# Patient Record
Sex: MALE | Race: WHITE | NOT HISPANIC OR LATINO
[De-identification: names, ages, dates, MRNs, and addresses within clinical notes are randomized per-mention and may not be internally consistent; named-entity substitution may affect disease eponyms.]

---

## 2018-06-29 PROBLEM — Z00.00 ENCOUNTER FOR PREVENTIVE HEALTH EXAMINATION: Status: ACTIVE | Noted: 2018-06-29

## 2018-07-05 ENCOUNTER — APPOINTMENT (OUTPATIENT)
Dept: OTHER | Facility: CLINIC | Age: 58
End: 2018-07-05
Payer: COMMERCIAL

## 2018-07-05 VITALS
DIASTOLIC BLOOD PRESSURE: 66 MMHG | HEIGHT: 71 IN | SYSTOLIC BLOOD PRESSURE: 109 MMHG | OXYGEN SATURATION: 98 % | HEART RATE: 60 BPM | RESPIRATION RATE: 16 BRPM | BODY MASS INDEX: 29.12 KG/M2 | WEIGHT: 208 LBS

## 2018-07-05 DIAGNOSIS — K21.9 GASTRO-ESOPHAGEAL REFLUX DISEASE W/OUT ESOPHAGITIS: ICD-10-CM

## 2018-07-05 PROCEDURE — 99396 PREV VISIT EST AGE 40-64: CPT

## 2018-07-05 PROCEDURE — 96150: CPT

## 2018-07-05 PROCEDURE — 94010 BREATHING CAPACITY TEST: CPT

## 2018-07-06 DIAGNOSIS — J38.2 NODULES OF VOCAL CORDS: ICD-10-CM

## 2018-07-06 LAB
ALBUMIN SERPL ELPH-MCNC: 4.2 G/DL
ALP BLD-CCNC: 49 U/L
ALT SERPL-CCNC: 26 U/L
ANION GAP SERPL CALC-SCNC: 16 MMOL/L
APPEARANCE: CLEAR
AST SERPL-CCNC: 29 U/L
BASOPHILS # BLD AUTO: 0.02 K/UL
BASOPHILS NFR BLD AUTO: 0.3 %
BILIRUB SERPL-MCNC: 0.3 MG/DL
BILIRUBIN URINE: NEGATIVE
BLOOD URINE: NEGATIVE
BUN SERPL-MCNC: 16 MG/DL
CALCIUM SERPL-MCNC: 9.8 MG/DL
CHLORIDE SERPL-SCNC: 101 MMOL/L
CHOLEST SERPL-MCNC: 259 MG/DL
CHOLEST/HDLC SERPL: 3.3 RATIO
CO2 SERPL-SCNC: 24 MMOL/L
COLOR: YELLOW
CREAT SERPL-MCNC: 1.1 MG/DL
EOSINOPHIL # BLD AUTO: 0.05 K/UL
EOSINOPHIL NFR BLD AUTO: 0.7 %
GLUCOSE QUALITATIVE U: NEGATIVE MG/DL
GLUCOSE SERPL-MCNC: 100 MG/DL
HCT VFR BLD CALC: 45 %
HDLC SERPL-MCNC: 79 MG/DL
HGB BLD-MCNC: 15.1 G/DL
IMM GRANULOCYTES NFR BLD AUTO: 0.3 %
KETONES URINE: NEGATIVE
LDLC SERPL CALC-MCNC: 158 MG/DL
LEUKOCYTE ESTERASE URINE: NEGATIVE
LYMPHOCYTES # BLD AUTO: 1.92 K/UL
LYMPHOCYTES NFR BLD AUTO: 27.8 %
MAN DIFF?: NORMAL
MCHC RBC-ENTMCNC: 31.2 PG
MCHC RBC-ENTMCNC: 33.6 GM/DL
MCV RBC AUTO: 93 FL
MONOCYTES # BLD AUTO: 0.4 K/UL
MONOCYTES NFR BLD AUTO: 5.8 %
NEUTROPHILS # BLD AUTO: 4.5 K/UL
NEUTROPHILS NFR BLD AUTO: 65.1 %
NITRITE URINE: NEGATIVE
PH URINE: 5.5
PLATELET # BLD AUTO: 246 K/UL
POTASSIUM SERPL-SCNC: 4.4 MMOL/L
PROT SERPL-MCNC: 7.4 G/DL
PROTEIN URINE: NEGATIVE MG/DL
RBC # BLD: 4.84 M/UL
RBC # FLD: 13.3 %
SODIUM SERPL-SCNC: 141 MMOL/L
SPECIFIC GRAVITY URINE: 1.01
TRIGL SERPL-MCNC: 109 MG/DL
UROBILINOGEN URINE: NEGATIVE MG/DL
WBC # FLD AUTO: 6.91 K/UL

## 2018-08-20 PROBLEM — J38.2 VOCAL CORD NODULES: Status: ACTIVE | Noted: 2018-08-20

## 2018-08-23 DIAGNOSIS — Z03.89 ENCOUNTER FOR OBSERVATION FOR OTHER SUSPECTED DISEASES AND CONDITIONS RULED OUT: ICD-10-CM

## 2018-09-10 ENCOUNTER — APPOINTMENT (OUTPATIENT)
Dept: PULMONOLOGY | Facility: CLINIC | Age: 58
End: 2018-09-10
Payer: COMMERCIAL

## 2018-09-10 VITALS
OXYGEN SATURATION: 98 % | WEIGHT: 210 LBS | HEART RATE: 69 BPM | TEMPERATURE: 98 F | RESPIRATION RATE: 16 BRPM | SYSTOLIC BLOOD PRESSURE: 110 MMHG | BODY MASS INDEX: 29.4 KG/M2 | HEIGHT: 71 IN | DIASTOLIC BLOOD PRESSURE: 70 MMHG

## 2018-09-10 PROCEDURE — 99204 OFFICE O/P NEW MOD 45 MIN: CPT

## 2018-11-05 ENCOUNTER — OUTPATIENT (OUTPATIENT)
Dept: OUTPATIENT SERVICES | Facility: HOSPITAL | Age: 58
LOS: 1 days | End: 2018-11-05
Payer: COMMERCIAL

## 2018-11-05 ENCOUNTER — APPOINTMENT (OUTPATIENT)
Dept: SLEEP CENTER | Facility: CLINIC | Age: 58
End: 2018-11-05
Payer: COMMERCIAL

## 2018-11-05 PROCEDURE — 95810 POLYSOM 6/> YRS 4/> PARAM: CPT | Mod: 26

## 2018-11-05 PROCEDURE — 95810 POLYSOM 6/> YRS 4/> PARAM: CPT

## 2018-11-06 DIAGNOSIS — Z03.89 ENCOUNTER FOR OBSERVATION FOR OTHER SUSPECTED DISEASES AND CONDITIONS RULED OUT: ICD-10-CM

## 2019-01-07 ENCOUNTER — APPOINTMENT (OUTPATIENT)
Dept: NEUROLOGY | Facility: CLINIC | Age: 59
End: 2019-01-07

## 2019-02-19 ENCOUNTER — MEDICATION RENEWAL (OUTPATIENT)
Age: 59
End: 2019-02-19

## 2019-02-22 ENCOUNTER — RX RENEWAL (OUTPATIENT)
Age: 59
End: 2019-02-22

## 2019-04-16 ENCOUNTER — RX RENEWAL (OUTPATIENT)
Age: 59
End: 2019-04-16

## 2019-07-31 ENCOUNTER — RX RENEWAL (OUTPATIENT)
Age: 59
End: 2019-07-31

## 2019-08-13 ENCOUNTER — APPOINTMENT (OUTPATIENT)
Dept: OTHER | Facility: CLINIC | Age: 59
End: 2019-08-13
Payer: COMMERCIAL

## 2019-08-13 VITALS
HEART RATE: 60 BPM | DIASTOLIC BLOOD PRESSURE: 70 MMHG | SYSTOLIC BLOOD PRESSURE: 112 MMHG | BODY MASS INDEX: 28.42 KG/M2 | WEIGHT: 203 LBS | OXYGEN SATURATION: 99 % | RESPIRATION RATE: 16 BRPM | HEIGHT: 71 IN

## 2019-08-13 DIAGNOSIS — Z04.9 ENCOUNTER FOR EXAMINATION AND OBSERVATION FOR UNSPECIFIED REASON: ICD-10-CM

## 2019-08-13 PROCEDURE — 94010 BREATHING CAPACITY TEST: CPT

## 2019-08-13 PROCEDURE — 99214 OFFICE O/P EST MOD 30 MIN: CPT | Mod: 25

## 2019-08-13 PROCEDURE — 99396 PREV VISIT EST AGE 40-64: CPT | Mod: 25

## 2019-08-13 NOTE — DISCUSSION/SUMMARY
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 \par  Exposed [place] [address] \par  Stated that started to experience dry cough , throat irritation , hoarseness, heartburn at the end of 2002 / did report all Sx during MV1 2008 and being treated with Aciphex/ , \par  He was evaluated by his MD< was prescribed Aciphex \par Subsequently was evaluated by ENT and GI doctor s\par  Now in Protonix \par ENT Diagnosed with VC nodules due to GERD \par Treated with DIET and PPI \par  EGD 05 10 2018\par Colonoscopy 3 years ago \par MEDS : Protonix, tried to wean \par PMH: GERD, VC nodules \par  PSH: lipoma removal from groin \par  Quit smoking 1998 , smokes 1 PPD 6 years \par  PE in trial DB \par Spirometry NL \par Had CXR 06 2018 \par  \par a/p WTC periodic MV\par  GERD-see OCC MEd follow up ntoe \par  [Please See Note in Chart and Documentation in Trial DB] : Please see note in chart and documentation in Trial DB.

## 2019-08-13 NOTE — HISTORY OF PRESENT ILLNESS
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 \par  Exposed to [place] of [place] [address] \par  Stated that started to experience dry cough , throat irritation , hoarseness, heartburn at the end of 2002 / did report all Sx during MV1 2008 and being treated with Aciphex/ , \par  He was evaluated by his MD< was prescribed Aciphex \par Subsequently was evaluated by ENT and GI doctor s\par  Now in Protonix, triwed to stop but had to resume due to Sx recurrence \par ENT Diagnosed with 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

## 2019-08-13 NOTE — PHYSICAL EXAM
[General Appearance - Alert] : alert [General Appearance - In No Acute Distress] : in no acute distress [Neck Appearance] : the appearance of the neck was normal [Neck Cervical Mass (___cm)] : no neck mass was observed [Jugular Venous Distention Increased] : there was no jugular-venous distention [Thyroid Nodule] : there were no palpable thyroid nodules [Thyroid Diffuse Enlargement] : the thyroid was not enlarged [] : no respiratory distress [Auscultation Breath Sounds / Voice Sounds] : lungs were clear to auscultation bilaterally [Heart Sounds] : normal S1 and S2 [Heart Rate And Rhythm] : heart rate was normal and rhythm regular [Murmurs] : no murmurs [Heart Sounds Gallop] : no gallops [Penis Abnormality] : the penis was normal [Heart Sounds Pericardial Friction Rub] : no pericardial rub [Scrotum] : the scrotum was normal [Testes Swelling] : the testicles were not swollen [Testes Mass (___cm)] : there were no testicular masses

## 2019-08-13 NOTE — REASON FOR VISIT
[Follow-Up] : a follow-up visit [FreeTextEntry1] : [facility]ER[facility]

## 2019-08-13 NOTE — HISTORY OF PRESENT ILLNESS
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 \par  Exposed to significant [place] of [place] [address] \par  Stated that started to experience dry cough , throat irritation , hoarseness, heartburn at the end of 2002 / did report all Sx during MV1 2008 and being treated with Aciphex/ , \par  He was evaluated by his MD< was prescribed Aciphex \par Subsequently was evaluated by ENT and GI doctor s\par  Now in Protonix, triwed to stop but had to resume due to Sx recurrence \par ENT Diagnosed with 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

## 2019-08-13 NOTE — HEALTH RISK ASSESSMENT
[Patient reported colonoscopy was normal] : Patient reported colonoscopy was normal [ColonoscopyDate] : 05/10/2016

## 2019-08-13 NOTE — PHYSICAL EXAM
[General Appearance - Alert] : alert [General Appearance - In No Acute Distress] : in no acute distress [Neck Appearance] : the appearance of the neck was normal [Neck Cervical Mass (___cm)] : no neck mass was observed [Jugular Venous Distention Increased] : there was no jugular-venous distention [Thyroid Diffuse Enlargement] : the thyroid was not enlarged [Thyroid Nodule] : there were no palpable thyroid nodules [] : no respiratory distress [Auscultation Breath Sounds / Voice Sounds] : lungs were clear to auscultation bilaterally [Heart Sounds] : normal S1 and S2 [Heart Rate And Rhythm] : heart rate was normal and rhythm regular [Heart Sounds Gallop] : no gallops [Murmurs] : no murmurs [Heart Sounds Pericardial Friction Rub] : no pericardial rub [Penis Abnormality] : the penis was normal [Scrotum] : the scrotum was normal [Testes Swelling] : the testicles were not swollen [Testes Mass (___cm)] : there were no testicular masses

## 2019-08-13 NOTE — ASSESSMENT
[FreeTextEntry1] : discussed possible side effects of long term PPI use \par diet discussed \par  pt will attempt wean off PPI again \par \par Sx of BISI \par  will repeat PSG despite having a negative test last year

## 2019-08-13 NOTE — DISCUSSION/SUMMARY
[Patient seen for WTC Monitoring ___] : Patient was seen for WTC monitoring [unfilled] [Please See Note in Chart and Documentation in Trial DB] : Please see note in chart and documentation in Trial DB. 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 \par  Ex[place] to [place] [address] \par  Stated that started to experience dry cough , throat irritation , hoarseness, heartburn at the end of 2002 / did report all Sx during MV1 2008 and being treated with Aciphex/ , \par  He was evaluated by his MD< was prescribed Aciphex \par Subsequently was evaluated by ENT and GI doctor s\par  Now in Protonix \par ENT Diagnosed with VC nodules due to GERD \par Treated with DIET and PPI \par  EGD 05 10 2018\par Colonoscopy 3 years ago \par MEDS : Protonix, tried to wean \par PMH: GERD, VC nodules \par  PSH: lipoma removal from groin \par  Quit smoking 1998 , smokes 1 PPD 6 years \par  PE in trial DB \par Spirometry NL \par Had CXR 06 2018 \par  \par a/p WTC periodic MV\par  GERD-see OCC MEd follow up ntoe \par

## 2019-08-14 LAB
ALBUMIN SERPL ELPH-MCNC: 4.6 G/DL
ALP BLD-CCNC: 57 U/L
ALT SERPL-CCNC: 26 U/L
ANION GAP SERPL CALC-SCNC: 13 MMOL/L
APPEARANCE: CLEAR
AST SERPL-CCNC: 32 U/L
BACTERIA: NEGATIVE
BASOPHILS # BLD AUTO: 0.04 K/UL
BASOPHILS NFR BLD AUTO: 0.5 %
BILIRUB SERPL-MCNC: 0.3 MG/DL
BILIRUBIN URINE: NEGATIVE
BLOOD URINE: NEGATIVE
BUN SERPL-MCNC: 12 MG/DL
CALCIUM SERPL-MCNC: 10.2 MG/DL
CHLORIDE SERPL-SCNC: 100 MMOL/L
CHOLEST SERPL-MCNC: 188 MG/DL
CHOLEST/HDLC SERPL: 2.8 RATIO
CO2 SERPL-SCNC: 27 MMOL/L
COLOR: COLORLESS
CREAT SERPL-MCNC: 1.02 MG/DL
EOSINOPHIL # BLD AUTO: 0.07 K/UL
EOSINOPHIL NFR BLD AUTO: 0.9 %
GLUCOSE QUALITATIVE U: NEGATIVE
GLUCOSE SERPL-MCNC: 99 MG/DL
HCT VFR BLD CALC: 46.3 %
HDLC SERPL-MCNC: 68 MG/DL
HGB BLD-MCNC: 14.9 G/DL
HYALINE CASTS: 0 /LPF
IMM GRANULOCYTES NFR BLD AUTO: 0.3 %
KETONES URINE: NEGATIVE
LDLC SERPL CALC-MCNC: 94 MG/DL
LEUKOCYTE ESTERASE URINE: NEGATIVE
LYMPHOCYTES # BLD AUTO: 1.62 K/UL
LYMPHOCYTES NFR BLD AUTO: 20.6 %
MAN DIFF?: NORMAL
MCHC RBC-ENTMCNC: 30 PG
MCHC RBC-ENTMCNC: 32.2 GM/DL
MCV RBC AUTO: 93.3 FL
MICROSCOPIC-UA: NORMAL
MONOCYTES # BLD AUTO: 0.58 K/UL
MONOCYTES NFR BLD AUTO: 7.4 %
NEUTROPHILS # BLD AUTO: 5.53 K/UL
NEUTROPHILS NFR BLD AUTO: 70.3 %
NITRITE URINE: NEGATIVE
PH URINE: 7
PLATELET # BLD AUTO: 266 K/UL
POTASSIUM SERPL-SCNC: 4.5 MMOL/L
PROT SERPL-MCNC: 7.4 G/DL
PROTEIN URINE: NEGATIVE
RBC # BLD: 4.96 M/UL
RBC # FLD: 13.1 %
RED BLOOD CELLS URINE: 0 /HPF
SODIUM SERPL-SCNC: 140 MMOL/L
SPECIFIC GRAVITY URINE: 1
SQUAMOUS EPITHELIAL CELLS: 0 /HPF
TRIGL SERPL-MCNC: 130 MG/DL
UROBILINOGEN URINE: NORMAL
WBC # FLD AUTO: 7.86 K/UL
WHITE BLOOD CELLS URINE: 0 /HPF

## 2019-12-17 ENCOUNTER — FORM ENCOUNTER (OUTPATIENT)
Age: 59
End: 2019-12-17

## 2020-04-19 ENCOUNTER — APPOINTMENT (OUTPATIENT)
Dept: SLEEP CENTER | Facility: CLINIC | Age: 60
End: 2020-04-19

## 2021-04-12 DIAGNOSIS — Z20.822 CONTACT WITH AND (SUSPECTED) EXPOSURE TO COVID-19: ICD-10-CM

## 2021-04-13 ENCOUNTER — NON-APPOINTMENT (OUTPATIENT)
Age: 61
End: 2021-04-13

## 2021-04-23 LAB — SARS-COV-2 N GENE NPH QL NAA+PROBE: NOT DETECTED

## 2021-04-26 ENCOUNTER — APPOINTMENT (OUTPATIENT)
Dept: PULMONOLOGY | Facility: CLINIC | Age: 61
End: 2021-04-26
Payer: COMMERCIAL

## 2021-04-26 VITALS
BODY MASS INDEX: 28.7 KG/M2 | TEMPERATURE: 97.9 F | RESPIRATION RATE: 16 BRPM | HEART RATE: 77 BPM | WEIGHT: 205 LBS | OXYGEN SATURATION: 98 % | DIASTOLIC BLOOD PRESSURE: 65 MMHG | HEIGHT: 71 IN | SYSTOLIC BLOOD PRESSURE: 100 MMHG

## 2021-04-26 DIAGNOSIS — K21.9 GASTRO-ESOPHAGEAL REFLUX DISEASE W/OUT ESOPHAGITIS: ICD-10-CM

## 2021-04-26 DIAGNOSIS — E78.5 HYPERLIPIDEMIA, UNSPECIFIED: ICD-10-CM

## 2021-04-26 DIAGNOSIS — R05 COUGH: ICD-10-CM

## 2021-04-26 DIAGNOSIS — Z87.898 PERSONAL HISTORY OF OTHER SPECIFIED CONDITIONS: ICD-10-CM

## 2021-04-26 PROCEDURE — ZZZZZ: CPT

## 2021-04-26 PROCEDURE — 99072 ADDL SUPL MATRL&STAF TM PHE: CPT

## 2021-04-26 PROCEDURE — 99204 OFFICE O/P NEW MOD 45 MIN: CPT | Mod: 25

## 2021-04-26 PROCEDURE — 94729 DIFFUSING CAPACITY: CPT

## 2021-04-26 PROCEDURE — 94727 GAS DIL/WSHOT DETER LNG VOL: CPT

## 2021-04-26 PROCEDURE — 94010 BREATHING CAPACITY TEST: CPT

## 2021-04-26 RX ORDER — FLUTICASONE PROPIONATE 50 UG/1
50 SPRAY, METERED NASAL
Qty: 16 | Refills: 0 | Status: ACTIVE | COMMUNITY
Start: 2021-02-26

## 2021-04-26 RX ORDER — ROSUVASTATIN CALCIUM 5 MG/1
5 TABLET, FILM COATED ORAL
Qty: 90 | Refills: 0 | Status: ACTIVE | COMMUNITY
Start: 2021-02-26

## 2021-04-26 RX ORDER — PANTOPRAZOLE 40 MG/1
40 TABLET, DELAYED RELEASE ORAL
Qty: 90 | Refills: 3 | Status: DISCONTINUED | COMMUNITY
Start: 2018-07-05 | End: 2021-04-26

## 2021-04-26 NOTE — PHYSICAL EXAM
[No Acute Distress] : no acute distress [Supple] : supple [No JVD] : no jvd [Normal S1, S2] : normal s1, s2 [No Murmurs] : no murmurs [Clear to Auscultation Bilaterally] : clear to auscultation bilaterally [Normal to Percussion] : normal to percussion [No Abnormalities] : no abnormalities [Benign] : benign [No HSM] : no hsm [Not Tender] : not tender [No Clubbing] : no clubbing [No Cyanosis] : no cyanosis [No Edema] : no edema

## 2021-04-27 NOTE — PROCEDURE
[FreeTextEntry1] : 04/26/2021\par Pulmonary function testing\par FEV1, FVC, and FEV1/FVC are within normal limits. TLC and subdivisions are normal. RV/TLC ratio is normal. Single breath diffusion capacity is normal. \par \par Surgical labs noted.\par Electrocardiogram reported sinus bradycardia otherwise normal.

## 2021-04-27 NOTE — HISTORY OF PRESENT ILLNESS
[TextBox_4] :  Patient is having Lipoma remove Dr Torey Alba fax both [phone] and [phone]  on May 3rd\par \par Presently feeling well.\par \par Patient had recent endoscopy in February and off PPI , \par \par Had preop testing.\par Given Flonase by GI and recc. D/C Protonix. For cough. \par \par Feeling well.\par No CP or SOB. \par \par

## 2021-04-27 NOTE — DISCUSSION/SUMMARY
[FreeTextEntry1] : Patient clinically stable.\par Preop for lipoma resection.\par Cough felt to be more likely upper airway in origin then associated with GERD.\par Normal lung function.

## 2021-04-27 NOTE — ASSESSMENT
[FreeTextEntry1] : Medical problems as above. Medical status maximized. No medical contraindications to surgery.\par

## 2021-05-10 ENCOUNTER — TRANSCRIPTION ENCOUNTER (OUTPATIENT)
Age: 61
End: 2021-05-10

## 2021-10-15 ENCOUNTER — APPOINTMENT (OUTPATIENT)
Dept: ORTHOPEDIC SURGERY | Facility: CLINIC | Age: 61
End: 2021-10-15
Payer: COMMERCIAL

## 2021-10-15 VITALS
WEIGHT: 210 LBS | HEART RATE: 77 BPM | HEIGHT: 71 IN | SYSTOLIC BLOOD PRESSURE: 100 MMHG | DIASTOLIC BLOOD PRESSURE: 65 MMHG | BODY MASS INDEX: 29.4 KG/M2

## 2021-10-15 DIAGNOSIS — M25.562 PAIN IN LEFT KNEE: ICD-10-CM

## 2021-10-15 DIAGNOSIS — S83.242A OTHER TEAR OF MEDIAL MENISCUS, CURRENT INJURY, LEFT KNEE, INITIAL ENCOUNTER: ICD-10-CM

## 2021-10-15 PROCEDURE — 99203 OFFICE O/P NEW LOW 30 MIN: CPT

## 2021-10-15 PROCEDURE — 73564 X-RAY EXAM KNEE 4 OR MORE: CPT | Mod: LT

## 2021-10-15 NOTE — REASON FOR VISIT
[Initial Visit] : an initial visit for [FreeTextEntry2] : Left knee pain. Referred by DR. Isaias Andre

## 2021-10-15 NOTE — HISTORY OF PRESENT ILLNESS
[de-identified] : 62 y/o male who is  for the [employer] presents with left knee pain for about 4 months and his symptoms are getting worse. He denies any injury or trauma. He was seen by Dr. Jabier Harris and had an MRI done. He has not received any treatment yet. \par \par He states the pain is a 4/10 at this visit and can get to a 8/10 at the worse. He is taking Mobic which does not seem to help. He denies any numbness or tingling. He states the pain can keep him up at night. 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 He has pain with certain activities. Stiffness when getting out of the car. Sometimes he is unable to bend his knee fully.

## 2021-10-15 NOTE — DISCUSSION/SUMMARY
[de-identified] : 62 y/o male with left knee pain secondary to osteoarthritis and a medial meniscus tear.  A lengthy discussion was held with the patient regarding the patient’s condition and treatment options including all risks, benefits, prognosis and outcomes of each were discussed in detail. The patient would like to continue with conservative management at this time. Non-operative treatment was advised and knee treatment handout was given and reviewed with the patient. Multiple non-operative treatment options have been discussed with the patient for treating knee pain secondary to osteoarthritis.\par These include:\par Weight reduction\par Low-impact exercise specifically emphasizing bicycling, swimming and working with weights as well as PT and he was provided with a prescription today\par Modification of stressful activities\par Change to lace up supportive well cushioned walking shoes\par Use of a knee sleeve with or without compression support and or a patellar cut-out\par P.R.N. use of acetaminophen if tolerated\par P.R.N. use of anti-inflammatory agents if tolerated\par Potential use of nutraceuticals\par Potential corticosteroid injection\par Potential hyaluronic acid general injections\par The patient will contact me if there are any concerns otherwise follow up will be on a prn basis. The patient expressed understanding and all questions were answered.\par

## 2021-10-15 NOTE — CONSULT LETTER
[Dear  ___] : Dear  [unfilled], [DrChris  ___] : Dr. COLLAZO [Please see my note below.] : Please see my note below. [FreeTextEntry1] : I had the pleasure of evaluating your patient in the office today for complaints of left knee pain secondary to osteoarthritis and a medial meniscus tear. I have enclosed a copy of today's office notes for your charts and for your review.\par \jorge Sincerely, \par \par Justin Rocha M.D.\par Professor and \par Department of Orthopedic Surgery\par [facility] [place]

## 2021-10-15 NOTE — PHYSICAL EXAM
[de-identified] : 60 y/o pleasant male in NAD and AAOx3. 29 BMI. The pt has a mildly antalgic gait. Physical examination of both knees reveals normal contours, skin intact with no signs of infection, no erythema, no swelling, no effusion, no distal lymphedema or phlebitis, no patholaxity. ROM of the left  knee reveals 0°-110° Strength is 5/5 within this arc of motion. There is pain on palpation to the medial joint line. Patient noted to have genu varum. There are no neurological deficits.  [de-identified] : X-rays were ordered, obtained, and interpreted by me today: 4 views of the left  knee demonstrate mild osteoarthritis with medial compartment narrowing, with no acute fracture or dislocation. 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

## 2021-11-16 ENCOUNTER — APPOINTMENT (OUTPATIENT)
Dept: ORTHOPEDIC SURGERY | Facility: CLINIC | Age: 61
End: 2021-11-16
Payer: COMMERCIAL

## 2021-11-16 VITALS — BODY MASS INDEX: 30.1 KG/M2 | WEIGHT: 215 LBS | HEIGHT: 71 IN

## 2021-11-16 DIAGNOSIS — M17.12 UNILATERAL PRIMARY OSTEOARTHRITIS, LEFT KNEE: ICD-10-CM

## 2021-11-16 PROCEDURE — 99213 OFFICE O/P EST LOW 20 MIN: CPT | Mod: 25

## 2021-11-16 PROCEDURE — 20610 DRAIN/INJ JOINT/BURSA W/O US: CPT | Mod: LT

## 2021-11-16 NOTE — PROCEDURE
[de-identified] : After careful discussion with the patient regarding the risks versus benefits of a corticosteroid and local anesthetic injection, the patient has decided to proceed ahead with the treatment. After sterile preparation of the site, an injection has been given into the left knee using 8 cc of half percent Marcaine without epinephrine and 2 cc of betamethasone. The site was cleaned and a band-aid was applied. The patient tolerated the injection without complication\par

## 2021-11-16 NOTE — PHYSICAL EXAM
[de-identified] : 62 y/o pleasant male in NAD and AAOx3. 30 BMI. The pt has a mildly antalgic gait. Physical examination of both knees reveals normal contours, skin intact with no signs of infection, no erythema, no swelling, no effusion, no distal lymphedema or phlebitis, no patholaxity. ROM of the left  knee reveals 0°-110°. Patient noted to have genu varum. There are no neurological deficits.

## 2021-11-16 NOTE — DISCUSSION/SUMMARY
[de-identified] : 62 y/o male with left knee pain secondary to osteoarthritis and a medial meniscus tear. Patient received a corticosteroid injection today without complications.   A lengthy discussion was held with the patient regarding the patient’s condition and treatment options including all risks, benefits, prognosis and outcomes of each were discussed in detail. Treatment options presented included conservative management, repeat corticosteroid injections, or arthroscopic surgery. At this time patient elected to have a cortisone injection and will observe for improvement in his symptoms. Follow up will be prn depending on improvement in his symptoms.

## 2021-11-16 NOTE — HISTORY OF PRESENT ILLNESS
[de-identified] : 60 y/o male who is  for the [employer] and was seen in the past for left knee OA and MM tear presents with continued left knee pain. He states that he was taking Aleve which did not really help. He is unable to straighten his leg. The pain is waking himi up at night. He states the pain is a 4/10 at this visit and can get to a 8/10 at the worse. He is here to talk about other treatment options.

## 2021-11-16 NOTE — CONSULT LETTER
[Dear  ___] : Dear  [unfilled], [Please see my note below.] : Please see my note below. [DrChris  ___] : Dr. COLLAZO [FreeTextEntry1] : I had the pleasure of evaluating your patient in the office today for complaints of left knee pain secondary to osteoarthritis and a medial meniscus tear. I have enclosed a copy of today's office notes for your charts and for your review.\par \jorge Sincerely, \par \par Justin Rocha M.D.\par Professor and \par Department of Orthopedic Surgery\par [facility] [place]

## 2022-01-21 ENCOUNTER — APPOINTMENT (OUTPATIENT)
Dept: ORTHOPEDIC SURGERY | Facility: CLINIC | Age: 62
End: 2022-01-21

## 2023-02-28 ENCOUNTER — OFFICE (OUTPATIENT)
Dept: URBAN - METROPOLITAN AREA CLINIC 35 | Facility: CLINIC | Age: 63
Setting detail: OPHTHALMOLOGY
End: 2023-02-28
Payer: COMMERCIAL

## 2023-02-28 DIAGNOSIS — H52.7: ICD-10-CM

## 2023-02-28 DIAGNOSIS — H01.004: ICD-10-CM

## 2023-02-28 DIAGNOSIS — H52.03: ICD-10-CM

## 2023-02-28 DIAGNOSIS — H01.001: ICD-10-CM

## 2023-02-28 DIAGNOSIS — H25.013: ICD-10-CM

## 2023-02-28 DIAGNOSIS — H25.13: ICD-10-CM

## 2023-02-28 PROCEDURE — 99214 OFFICE O/P EST MOD 30 MIN: CPT | Performed by: OPHTHALMOLOGY

## 2023-02-28 PROCEDURE — 92015 DETERMINE REFRACTIVE STATE: CPT | Performed by: OPHTHALMOLOGY

## 2023-02-28 ASSESSMENT — REFRACTION_MANIFEST
OS_AXIS: 095
OD_CYLINDER: -0.50
OS_AXIS: 090
OS_SPHERE: +2.75
OS_ADD: +1.75
OD_CYLINDER: -0.50
OS_SPHERE: +2.75
OD_CYLINDER: -0.50
OS_AXIS: 090
OS_AXIS: 095
OS_VA1: 20/20
OS_CYLINDER: -0.75
OS_CYLINDER: +0.50
OS_SPHERE: +1.50
OD_AXIS: 090
OS_CYLINDER: -0.50
OS_SPHERE: +2.00
OD_AXIS: 095
OD_AXIS: 090
OD_SPHERE: +1.50
OD_AXIS: 084
OD_ADD: +1.50
OD_VA1: 20/20
OD_VA1: 20/20-2
OD_SPHERE: +1.50
OD_AXIS: 080
OD_CYLINDER: -0.50
OS_CYLINDER: -0.75
OD_VA1: 20/20
OD_VA1: 20/20
OS_VA1: 20/20
OD_SPHERE: +1.50
OD_SPHERE: +2.00
OD_CYLINDER: -0.50
OS_VA1: 20/20
OD_SPHERE: +2.25
OS_SPHERE: +1.50
OS_CYLINDER: -0.75
OD_CYLINDER: -0.50
OS_SPHERE: +1.75
OD_VA1: 20/20
OD_SPHERE: +3.00
OD_ADD: +1.75
OS_AXIS: 090
OD_CYLINDER: -0.50
OS_SPHERE: +2.75
OS_AXIS: 091
OS_CYLINDER: -0.75
OD_SPHERE: +1.50
OD_AXIS: 085
OS_VA1: 20/20
OD_SPHERE: +2.50
OD_AXIS: 90
OD_CYLINDER: -0.75
OS_CYLINDER: -0.50
OS_VA1: 20/20
OS_ADD: +1.50
OD_SPHERE: +1.50
OS_CYLINDER: -0.75
OS_SPHERE: +2.00
OS_SPHERE: +1.50
OS_AXIS: 090
OS_AXIS: 90
OS_CYLINDER: -0.75
OD_CYLINDER: -0.50
OD_AXIS: 095
OD_AXIS: 90
OS_AXIS: 90

## 2023-02-28 ASSESSMENT — SPHEQUIV_DERIVED
OS_SPHEQUIV: 1.125
OD_SPHEQUIV: 1.25
OD_SPHEQUIV: 1.25
OS_SPHEQUIV: 2.25
OS_SPHEQUIV: 1.375
OS_SPHEQUIV: 2.25
OD_SPHEQUIV: 1.875
OS_SPHEQUIV: 1.625
OS_SPHEQUIV: 2.375
OD_SPHEQUIV: 2.75
OD_SPHEQUIV: 1.25
OS_SPHEQUIV: 2.5
OD_SPHEQUIV: 1.75
OS_SPHEQUIV: 2.375
OD_SPHEQUIV: 1.25
OS_SPHEQUIV: 1.25
OD_SPHEQUIV: 1.75
OD_SPHEQUIV: 1.25
OD_SPHEQUIV: 2.25
OS_SPHEQUIV: 1.125

## 2023-02-28 ASSESSMENT — REFRACTION_CURRENTRX
OD_AXIS: 094
OS_SPHERE: +2.75
OS_AXIS: 084
OS_VPRISM_DIRECTION: SV
OD_SPHERE: +2.50
OD_CYLINDER: -0.50
OD_OVR_VA: 20/
OD_CYLINDER: -0.50
OS_CYLINDER: -0.75
OD_OVR_VA: 20/
OS_OVR_VA: 20/
OS_AXIS: 090
OS_OVR_VA: 20/
OD_SPHERE: +2.50
OD_VPRISM_DIRECTION: SV
OS_CYLINDER: -0.75
OD_VPRISM_DIRECTION: SV
OD_AXIS: 089
OS_SPHERE: +2.75
OS_VPRISM_DIRECTION: SV

## 2023-02-28 ASSESSMENT — VISUAL ACUITY
OD_BCVA: 20/40-2
OS_BCVA: 20/25-1

## 2023-02-28 ASSESSMENT — REFRACTION_AUTOREFRACTION
OS_CYLINDER: +0.50
OS_AXIS: 091
OD_AXIS: 084
OD_SPHERE: +2.00
OD_CYLINDER: -0.50
OS_SPHERE: +2.00

## 2023-02-28 ASSESSMENT — CONFRONTATIONAL VISUAL FIELD TEST (CVF)
OD_FINDINGS: FULL
OS_FINDINGS: FULL

## 2023-02-28 ASSESSMENT — LID EXAM ASSESSMENTS
OS_BLEPHARITIS: LUL 1+
OD_BLEPHARITIS: RUL 1+

## 2024-03-11 ENCOUNTER — OFFICE (OUTPATIENT)
Dept: URBAN - METROPOLITAN AREA CLINIC 35 | Facility: CLINIC | Age: 64
Setting detail: OPHTHALMOLOGY
End: 2024-03-11
Payer: COMMERCIAL

## 2024-03-11 DIAGNOSIS — H52.7: ICD-10-CM

## 2024-03-11 DIAGNOSIS — H01.001: ICD-10-CM

## 2024-03-11 DIAGNOSIS — H01.004: ICD-10-CM

## 2024-03-11 DIAGNOSIS — H25.013: ICD-10-CM

## 2024-03-11 DIAGNOSIS — H25.13: ICD-10-CM

## 2024-03-11 PROCEDURE — 92014 COMPRE OPH EXAM EST PT 1/>: CPT | Performed by: OPHTHALMOLOGY

## 2024-03-11 ASSESSMENT — SPHEQUIV_DERIVED
OS_SPHEQUIV: 2.375
OD_SPHEQUIV: 1.875
OD_SPHEQUIV: 1.25
OS_SPHEQUIV: 1.125
OD_SPHEQUIV: 2.75
OD_SPHEQUIV: 2.25
OD_SPHEQUIV: 1.25
OS_SPHEQUIV: 1.375
OS_SPHEQUIV: 2.375
OS_SPHEQUIV: 1.125
OS_SPHEQUIV: 1.625
OS_SPHEQUIV: 2.25
OD_SPHEQUIV: 1.75
OS_SPHEQUIV: 2.5
OD_SPHEQUIV: 1.25
OS_SPHEQUIV: 1.25
OD_SPHEQUIV: 1.25
OD_SPHEQUIV: 1.25

## 2024-03-11 ASSESSMENT — REFRACTION_CURRENTRX
OS_CYLINDER: -0.75
OD_VPRISM_DIRECTION: SV
OS_OVR_VA: 20/
OS_OVR_VA: 20/
OD_VPRISM_DIRECTION: SV
OD_AXIS: 089
OD_OVR_VA: 20/
OS_VPRISM_DIRECTION: SV
OS_VPRISM_DIRECTION: SV
OS_SPHERE: +2.75
OD_OVR_VA: 20/
OS_SPHERE: +2.75
OD_SPHERE: +2.50
OS_AXIS: 084
OD_CYLINDER: -0.50
OD_AXIS: 094
OD_CYLINDER: -0.50
OS_CYLINDER: -0.75
OD_SPHERE: +2.50
OS_AXIS: 090

## 2024-03-11 ASSESSMENT — REFRACTION_MANIFEST
OD_CYLINDER: -0.75
OS_VA1: 20/20
OS_ADD: +1.50
OD_SPHERE: +1.50
OS_CYLINDER: -0.75
OD_AXIS: 095
OD_CYLINDER: -0.50
OD_AXIS: 90
OS_CYLINDER: -0.75
OS_AXIS: 095
OD_SPHERE: +1.50
OD_CYLINDER: -0.50
OS_AXIS: 090
OD_CYLINDER: -0.50
OS_AXIS: 090
OS_CYLINDER: -0.50
OS_SPHERE: +2.00
OS_AXIS: 095
OD_VA1: 20/20
OD_SPHERE: +3.00
OS_AXIS: 091
OD_VA1: 20/20
OS_CYLINDER: +0.50
OD_SPHERE: +2.25
OD_SPHERE: +1.50
OS_VA1: 20/20
OD_ADD: +1.50
OS_SPHERE: +1.50
OD_AXIS: 90
OS_SPHERE: +1.50
OS_AXIS: 90
OD_CYLINDER: -0.50
OD_CYLINDER: -0.50
OS_VA1: 20/20
OD_SPHERE: +1.50
OD_AXIS: 090
OD_AXIS: 095
OD_VA1: 20/20-2
OD_CYLINDER: -0.50
OS_VA1: 20/20
OS_SPHERE: +1.75
OS_CYLINDER: -0.75
OD_SPHERE: +2.50
OS_AXIS: 90
OS_VA1: 20/20
OD_ADD: +1.75
OD_AXIS: 080
OS_CYLINDER: -0.75
OS_AXIS: 090
OD_SPHERE: +1.50
OS_CYLINDER: -0.50
OS_ADD: +1.75
OD_AXIS: 090
OD_AXIS: 084
OS_SPHERE: +1.50
OD_VA1: 20/20
OD_CYLINDER: -0.50
OS_SPHERE: +2.75
OS_AXIS: 090
OS_CYLINDER: -0.75
OS_SPHERE: +2.75
OS_SPHERE: +2.75
OS_CYLINDER: -0.75
OD_CYLINDER: -0.50
OS_SPHERE: +2.00
OD_SPHERE: +2.00
OD_AXIS: 085
OD_VA1: 20/20

## 2024-03-11 ASSESSMENT — LID EXAM ASSESSMENTS
OS_BLEPHARITIS: LUL 1+
OD_BLEPHARITIS: RUL 1+

## 2025-05-15 ENCOUNTER — DOCTOR'S OFFICE (OUTPATIENT)
Facility: LOCATION | Age: 65
Setting detail: OPHTHALMOLOGY
End: 2025-05-15
Payer: MEDICARE

## 2025-05-15 DIAGNOSIS — H25.013: ICD-10-CM

## 2025-05-15 DIAGNOSIS — H25.13: ICD-10-CM

## 2025-05-15 DIAGNOSIS — H01.001: ICD-10-CM

## 2025-05-15 DIAGNOSIS — H01.004: ICD-10-CM

## 2025-05-15 PROCEDURE — 92014 COMPRE OPH EXAM EST PT 1/>: CPT | Performed by: OPHTHALMOLOGY

## 2025-05-15 ASSESSMENT — REFRACTION_MANIFEST
OD_SPHERE: +1.50
OS_VA1: 20/20
OS_AXIS: 090
OD_SPHERE: +1.50
OD_VA1: 20/20
OS_SPHERE: +2.00
OD_SPHERE: +1.75
OD_SPHERE: +2.25
OS_AXIS: 095
OS_CYLINDER: -0.75
OS_VA1: 20/20
OD_AXIS: 084
OD_CYLINDER: -0.50
OS_VA1: 20/20
OD_AXIS: 90
OS_CYLINDER: -0.75
OD_CYLINDER: -0.50
OS_CYLINDER: -0.50
OS_VA1: 20/20
OS_CYLINDER: -0.50
OD_AXIS: 90
OD_AXIS: 090
OS_AXIS: 090
OD_SPHERE: +1.50
OD_AXIS: 095
OS_CYLINDER: -0.75
OD_CYLINDER: -0.50
OS_SPHERE: +2.00
OS_VA1: 20/20
OD_VA1: 20/20
OS_AXIS: 091
OD_VA1: 20/20
OS_SPHERE: +2.75
OD_CYLINDER: -0.50
OD_VA1: 20/20-2
OD_CYLINDER: -0.75
OS_VA1: 20/20
OS_AXIS: 090
OD_AXIS: 080
OD_CYLINDER: -0.50
OS_CYLINDER: -0.50
OS_AXIS: 90
OD_CYLINDER: -0.50
OS_ADD: +1.75
OD_SPHERE: +2.00
OS_SPHERE: +2.75
OS_AXIS: 095
OD_AXIS: 095
OD_CYLINDER: -0.50
OS_SPHERE: -2.00
OD_SPHERE: +2.50
OS_AXIS: 090
OS_VA1: 20/20
OS_CYLINDER: -0.75
OD_SPHERE: +2.00
OS_AXIS: 095
OS_SPHERE: +1.50
OD_CYLINDER: -0.50
OS_AXIS: 090
OS_CYLINDER: -0.75
OD_VA1: 20/20
OD_SPHERE: +1.50
OD_AXIS: 085
OD_SPHERE: +2.75
OD_VA1: 20/20
OD_AXIS: 080
OD_VA1: 20/20
OD_ADD: +1.50
OS_CYLINDER: +0.50
OS_ADD: +1.50
OS_AXIS: 90
OS_SPHERE: +1.75
OD_AXIS: 098
OD_AXIS: 090
OS_AXIS: 091
OD_SPHERE: +1.50
OS_CYLINDER: -0.50
OS_SPHERE: +2.00
OD_CYLINDER: -0.50
OS_SPHERE: +1.50
OS_CYLINDER: -0.75
OD_ADD: +1.75
OS_SPHERE: +2.75
OD_SPHERE: +3.00
OS_SPHERE: +1.50
OS_SPHERE: +2.75
OD_AXIS: 090
OS_CYLINDER: -0.50

## 2025-05-15 ASSESSMENT — REFRACTION_AUTOREFRACTION
OD_AXIS: 082
OD_CYLINDER: -0.50
OS_AXIS: 095
OD_SPHERE: +1.75
OS_SPHERE: +2.00
OS_CYLINDER: -0.75

## 2025-05-15 ASSESSMENT — KERATOMETRY
OS_K1POWER_DIOPTERS: 43.50
OS_K2POWER_DIOPTERS: 43.75
OS_AXISANGLE_DEGREES: 133
OD_K2POWER_DIOPTERS: 43.50
OD_K1POWER_DIOPTERS: 43.25
METHOD_AUTO_MANUAL: AUTO
OD_AXISANGLE_DEGREES: 059

## 2025-05-15 ASSESSMENT — REFRACTION_CURRENTRX
OS_SPHERE: +2.50
OS_SPHERE: +2.75
OS_VPRISM_DIRECTION: SV
OD_CYLINDER: -0.50
OS_SPHERE: +2.75
OS_CYLINDER: -0.50
OS_CYLINDER: -0.75
OD_CYLINDER: -0.50
OD_CYLINDER: -0.50
OD_VPRISM_DIRECTION: SV
OS_OVR_VA: 20/
OD_VPRISM_DIRECTION: SV
OS_OVR_VA: 20/
OS_AXIS: 084
OD_SPHERE: +2.50
OD_SPHERE: +2.50
OS_AXIS: 091
OS_VPRISM_DIRECTION: SV
OD_OVR_VA: 20/
OD_AXIS: 098
OD_SPHERE: +2.50
OS_OVR_VA: 20/
OD_AXIS: 089
OS_AXIS: 090
OS_CYLINDER: -0.75
OD_OVR_VA: 20/
OD_OVR_VA: 20/
OD_VPRISM_DIRECTION: SV
OD_AXIS: 094
OS_VPRISM_DIRECTION: SV

## 2025-05-15 ASSESSMENT — CONFRONTATIONAL VISUAL FIELD TEST (CVF)
OD_FINDINGS: FULL
OS_FINDINGS: FULL

## 2025-05-15 ASSESSMENT — VISUAL ACUITY
OS_BCVA: 20/40+1
OD_BCVA: 20/30-1

## 2025-05-15 ASSESSMENT — TONOMETRY
OD_IOP_MMHG: 19
OS_IOP_MMHG: 19

## 2025-05-15 ASSESSMENT — LID EXAM ASSESSMENTS
OD_BLEPHARITIS: RUL 1+
OS_BLEPHARITIS: LUL 1+